# Patient Record
Sex: MALE | Race: OTHER | NOT HISPANIC OR LATINO
[De-identification: names, ages, dates, MRNs, and addresses within clinical notes are randomized per-mention and may not be internally consistent; named-entity substitution may affect disease eponyms.]

---

## 2017-06-20 PROBLEM — Z00.00 ENCOUNTER FOR PREVENTIVE HEALTH EXAMINATION: Status: ACTIVE | Noted: 2017-06-20

## 2017-06-22 ENCOUNTER — APPOINTMENT (OUTPATIENT)
Dept: OTOLARYNGOLOGY | Facility: CLINIC | Age: 47
End: 2017-06-22

## 2017-06-22 VITALS
TEMPERATURE: 98.6 F | SYSTOLIC BLOOD PRESSURE: 109 MMHG | HEART RATE: 62 BPM | HEIGHT: 71.5 IN | BODY MASS INDEX: 21.91 KG/M2 | WEIGHT: 160 LBS | DIASTOLIC BLOOD PRESSURE: 74 MMHG | OXYGEN SATURATION: 95 %

## 2017-06-22 DIAGNOSIS — J35.8 OTHER CHRONIC DISEASES OF TONSILS AND ADENOIDS: ICD-10-CM

## 2017-06-22 DIAGNOSIS — Z87.891 PERSONAL HISTORY OF NICOTINE DEPENDENCE: ICD-10-CM

## 2017-06-22 DIAGNOSIS — Z87.19 PERSONAL HISTORY OF OTHER DISEASES OF THE DIGESTIVE SYSTEM: ICD-10-CM

## 2017-06-22 DIAGNOSIS — J39.2 OTHER DISEASES OF PHARYNX: ICD-10-CM

## 2017-06-22 DIAGNOSIS — R19.6 HALITOSIS: ICD-10-CM

## 2017-06-22 RX ORDER — FINASTERIDE 5 MG/1
5 TABLET, FILM COATED ORAL
Qty: 30 | Refills: 0 | Status: ACTIVE | COMMUNITY
Start: 2016-11-08

## 2017-06-22 RX ORDER — CLOBETASOL PROPIONATE 0.5 MG/ML
0.05 SOLUTION TOPICAL
Qty: 50 | Refills: 0 | Status: ACTIVE | COMMUNITY
Start: 2016-11-08

## 2017-06-23 PROBLEM — Z87.891 FORMER SMOKER: Status: ACTIVE | Noted: 2017-06-23

## 2017-11-27 RX ORDER — DOCUSATE SODIUM 100 MG/1
100 CAPSULE ORAL
Qty: 1 | Refills: 0 | Status: ACTIVE | COMMUNITY
Start: 2017-11-27 | End: 1900-01-01

## 2017-11-27 RX ORDER — METHYLPREDNISOLONE 4 MG/1
4 TABLET ORAL
Qty: 1 | Refills: 0 | Status: ACTIVE | COMMUNITY
Start: 2017-11-27 | End: 1900-01-01

## 2017-11-27 RX ORDER — LIDOCAINE HYDROCHLORIDE 20 MG/ML
2 SOLUTION OROPHARYNGEAL
Qty: 1 | Refills: 3 | Status: ACTIVE | COMMUNITY
Start: 2017-11-27 | End: 1900-01-01

## 2017-11-28 ENCOUNTER — APPOINTMENT (OUTPATIENT)
Dept: OTOLARYNGOLOGY | Facility: AMBULATORY SURGERY CENTER | Age: 47
End: 2017-11-28

## 2017-11-28 ENCOUNTER — OUTPATIENT (OUTPATIENT)
Dept: OUTPATIENT SERVICES | Facility: HOSPITAL | Age: 47
LOS: 1 days | Discharge: HOME | End: 2017-11-28
Payer: COMMERCIAL

## 2017-11-28 PROCEDURE — 42826 REMOVAL OF TONSILS: CPT | Mod: 52

## 2017-11-28 PROCEDURE — 42808 EXCISE PHARYNX LESION: CPT

## 2017-12-05 ENCOUNTER — APPOINTMENT (OUTPATIENT)
Dept: OTOLARYNGOLOGY | Facility: CLINIC | Age: 47
End: 2017-12-05
Payer: COMMERCIAL

## 2017-12-05 VITALS
OXYGEN SATURATION: 96 % | SYSTOLIC BLOOD PRESSURE: 106 MMHG | TEMPERATURE: 98.5 F | HEART RATE: 61 BPM | DIASTOLIC BLOOD PRESSURE: 66 MMHG

## 2017-12-05 PROCEDURE — 99024 POSTOP FOLLOW-UP VISIT: CPT

## 2017-12-06 RX ORDER — ACETAMINOPHEN AND CODEINE 300; 30 MG/1; MG/1
300-30 TABLET ORAL
Qty: 30 | Refills: 0 | Status: COMPLETED | COMMUNITY
Start: 2017-11-27 | End: 2017-12-06

## 2017-12-06 RX ORDER — AMOXICILLIN 500 MG/1
500 CAPSULE ORAL
Qty: 14 | Refills: 0 | Status: COMPLETED | COMMUNITY
Start: 2017-11-27 | End: 2017-12-06

## 2020-10-18 ENCOUNTER — TRANSCRIPTION ENCOUNTER (OUTPATIENT)
Age: 50
End: 2020-10-18

## 2024-01-10 ENCOUNTER — OUTPATIENT (OUTPATIENT)
Dept: OUTPATIENT SERVICES | Facility: HOSPITAL | Age: 54
LOS: 1 days | End: 2024-01-10

## 2024-01-10 VITALS
HEART RATE: 55 BPM | WEIGHT: 149.91 LBS | OXYGEN SATURATION: 98 % | SYSTOLIC BLOOD PRESSURE: 133 MMHG | TEMPERATURE: 98 F | DIASTOLIC BLOOD PRESSURE: 72 MMHG | RESPIRATION RATE: 16 BRPM | HEIGHT: 71 IN

## 2024-01-10 DIAGNOSIS — M20.42 OTHER HAMMER TOE(S) (ACQUIRED), LEFT FOOT: ICD-10-CM

## 2024-01-10 DIAGNOSIS — Z41.8 ENCOUNTER FOR OTHER PROCEDURES FOR PURPOSES OTHER THAN REMEDYING HEALTH STATE: Chronic | ICD-10-CM

## 2024-01-10 DIAGNOSIS — Z98.890 OTHER SPECIFIED POSTPROCEDURAL STATES: Chronic | ICD-10-CM

## 2024-01-10 NOTE — H&P PST ADULT - FUNCTIONAL STATUS
Walks 1 to 2 blocks, climbs 1 flight of stairs, ADLs/4-10 METS patient is a marathon runner/4-10 METS

## 2024-01-10 NOTE — H&P PST ADULT - PROBLEM SELECTOR PLAN 1
Patient tentatively scheduled for patient is scheduled for left foot 2nd digit arthroplasty w/metatarsal phalangeal joint release and kwire fixation     Pre-op instructions provided. Pt given verbal and written instructions with teach back on chlorhexidine shampoo and Pepcid. Pt verbalized understanding with return demonstration.    copy CBC and BMP in the chart

## 2024-01-10 NOTE — H&P PST ADULT - NSICDXPASTSURGICALHX_GEN_ALL_CORE_FT
PAST SURGICAL HISTORY:  Encounter for elective hair transplant     S/P lateral meniscus repair of left knee

## 2024-01-10 NOTE — H&P PST ADULT - HISTORY OF PRESENT ILLNESS
Patient is 53 year male presents with preop dx of pain in left toe and acquired hammer toe. patient is scheduled for left foot 2nd digit arthroplasty w/metatarsal phalangeal joint release and kwire fixation

## 2024-01-16 ENCOUNTER — TRANSCRIPTION ENCOUNTER (OUTPATIENT)
Age: 54
End: 2024-01-16

## 2024-01-17 ENCOUNTER — RESULT REVIEW (OUTPATIENT)
Age: 54
End: 2024-01-17

## 2024-01-17 ENCOUNTER — OUTPATIENT (OUTPATIENT)
Dept: OUTPATIENT SERVICES | Facility: HOSPITAL | Age: 54
LOS: 1 days | Discharge: HOME | End: 2024-01-17
Payer: COMMERCIAL

## 2024-01-17 ENCOUNTER — TRANSCRIPTION ENCOUNTER (OUTPATIENT)
Age: 54
End: 2024-01-17

## 2024-01-17 VITALS
RESPIRATION RATE: 16 BRPM | HEART RATE: 64 BPM | WEIGHT: 149.91 LBS | TEMPERATURE: 98 F | OXYGEN SATURATION: 98 % | SYSTOLIC BLOOD PRESSURE: 119 MMHG | HEIGHT: 71 IN | DIASTOLIC BLOOD PRESSURE: 79 MMHG

## 2024-01-17 VITALS
RESPIRATION RATE: 17 BRPM | SYSTOLIC BLOOD PRESSURE: 136 MMHG | OXYGEN SATURATION: 100 % | HEART RATE: 51 BPM | DIASTOLIC BLOOD PRESSURE: 89 MMHG | TEMPERATURE: 97 F

## 2024-01-17 DIAGNOSIS — Z98.890 OTHER SPECIFIED POSTPROCEDURAL STATES: Chronic | ICD-10-CM

## 2024-01-17 DIAGNOSIS — M20.42 OTHER HAMMER TOE(S) (ACQUIRED), LEFT FOOT: ICD-10-CM

## 2024-01-17 DIAGNOSIS — Z41.8 ENCOUNTER FOR OTHER PROCEDURES FOR PURPOSES OTHER THAN REMEDYING HEALTH STATE: Chronic | ICD-10-CM

## 2024-01-17 PROCEDURE — 73630 X-RAY EXAM OF FOOT: CPT | Mod: 26,LT

## 2024-01-17 DEVICE — K-WIRE S&N DOUBLE TROCAR 0.045" X 4": Type: IMPLANTABLE DEVICE | Status: FUNCTIONAL

## 2024-01-17 RX ORDER — FAMOTIDINE 10 MG/ML
0 INJECTION INTRAVENOUS
Refills: 0 | DISCHARGE

## 2024-01-17 RX ORDER — MINOXIDIL 10 MG
1 TABLET ORAL
Refills: 0 | DISCHARGE

## 2024-01-17 RX ORDER — FINASTERIDE 5 MG/1
1 TABLET, FILM COATED ORAL
Refills: 0 | DISCHARGE

## 2024-01-17 NOTE — ASU PREOP CHECKLIST - BLOOD AVAILABLE
[facility] parkinsons disease htn with atrial fibrillation; allergy to asa;  ct of head, labs, xray  admit for cardiology eval; anticoagulation
n/a

## 2024-01-17 NOTE — ASU DISCHARGE PLAN (ADULT/PEDIATRIC) - CARE PROVIDER_API CALL
Abril Shelley  Podiatric Medicine and Surgery  [address] [place] [id number]  Phone: [phone]  Fax: [phone]  Follow Up Time: 1 week

## 2024-01-17 NOTE — ASU DISCHARGE PLAN (ADULT/PEDIATRIC) - NURSING INSTRUCTIONS
You were given IV Toradol for pain management in the Operating Room. Please do NOT take Ibuprofen (NSAIDS) products (Motrin, Aleve, Advil) for the next 6-8 hours (After  7:00PM). Please take ibuprophen with food to avoid gastrointestinal issues.

## 2024-08-24 ENCOUNTER — NON-APPOINTMENT (OUTPATIENT)
Age: 54
End: 2024-08-24

## 2025-02-05 ENCOUNTER — DOCTOR'S OFFICE (OUTPATIENT)
Facility: LOCATION | Age: 55
Setting detail: OPHTHALMOLOGY
End: 2025-02-05
Payer: COMMERCIAL

## 2025-02-05 DIAGNOSIS — H25.013: ICD-10-CM

## 2025-02-05 DIAGNOSIS — H52.4: ICD-10-CM

## 2025-02-05 DIAGNOSIS — H01.001: ICD-10-CM

## 2025-02-05 DIAGNOSIS — H11.152: ICD-10-CM

## 2025-02-05 DIAGNOSIS — H52.03: ICD-10-CM

## 2025-02-05 DIAGNOSIS — H01.004: ICD-10-CM

## 2025-02-05 PROCEDURE — 92015 DETERMINE REFRACTIVE STATE: CPT | Performed by: OPHTHALMOLOGY

## 2025-02-05 PROCEDURE — 92014 COMPRE OPH EXAM EST PT 1/>: CPT | Performed by: OPHTHALMOLOGY

## 2025-02-05 ASSESSMENT — REFRACTION_AUTOREFRACTION
OD_CYLINDER: -0.50
OS_CYLINDER: SPHERE
OS_AXIS: 000
OD_SPHERE: +1.50
OD_AXIS: 087
OS_SPHERE: +0.50

## 2025-02-05 ASSESSMENT — REFRACTION_MANIFEST
OS_CYLINDER: SPHERE
OD_VA1: 20/20
OD_AXIS: 090
OD_VA1: 20/20
OD_VA1: 20/20
OD_SPHERE: +1.50
OD_SPHERE: +1.50
OD_CYLINDER: -0.50
OS_VA1: 20/20
OS_VA1: 20/20
OS_SPHERE: +0.75
OS_AXIS: 000
OD_CYLINDER: -0.50
OS_VA1: 20/20
OD_SPHERE: +0.75
OS_CYLINDER: SPHERE
OS_ADD: +2.25
OD_ADD: +2.25
OS_SPHERE: +0.75
OS_CYLINDER: SPHERE
OS_SPHERE: +0.50
OD_CYLINDER: -0.50
OD_AXIS: 075
OD_AXIS: 090
OS_ADD: +2.25
OD_ADD: +2.25

## 2025-02-05 ASSESSMENT — REFRACTION_CURRENTRX
OD_SPHERE: +0.25
OD_CYLINDER: SPHERE
OD_OVR_VA: 20/
OS_AXIS: 131
OS_CYLINDER: -0.25
OD_VPRISM_DIRECTION: PROGS
OS_SPHERE: +0.50
OD_ADD: +2.00
OS_ADD: +2.25
OS_AXIS: 000
OD_AXIS: 071
OS_OVR_VA: 20/
OD_AXIS: 0
OS_OVR_VA: 20/
OS_VPRISM_DIRECTION: PROGS
OD_OVR_VA: 20/
OS_CYLINDER: SPHERE
OS_ADD: +2.00
OS_SPHERE: +0.50
OD_ADD: +2.00
OD_SPHERE: +1.00
OD_CYLINDER: -0.50

## 2025-02-05 ASSESSMENT — KERATOMETRY
OD_K2POWER_DIOPTERS: 40.50
OD_AXISANGLE_DEGREES: 090
OD_K1POWER_DIOPTERS: 40.50
OS_K1POWER_DIOPTERS: 40.00
OS_K2POWER_DIOPTERS: 40.25
OS_AXISANGLE_DEGREES: 166

## 2025-02-05 ASSESSMENT — CONFRONTATIONAL VISUAL FIELD TEST (CVF)
OS_FINDINGS: FULL
OD_FINDINGS: FULL

## 2025-02-05 ASSESSMENT — LID EXAM ASSESSMENTS
OD_BLEPHARITIS: RUL
OS_BLEPHARITIS: LUL

## 2025-02-05 ASSESSMENT — TONOMETRY
OD_IOP_MMHG: 16
OS_IOP_MMHG: 16

## 2025-02-05 ASSESSMENT — VISUAL ACUITY
OD_BCVA: 20/20
OS_BCVA: 20/20-2

## 2025-08-13 NOTE — ASU PREOPERATIVE ASSESSMENT, ADULT ([facility] ONLY) - FALL HARM RISK - CONCLUSION
Speech Therapy    Patient not seen in therapy.    Unavailable due to sleeping soundly.      Additional details:  Received patient in bed, sleeping. Unable to maintain adequate alertness despite verbal/tactile stimuli. Per RN, overall poor oral intake.     OBJECTIVE                                 Therapy procedure time and total treatment time can be found documented on the Time Entry flowsheet   Universal Safety Interventions

## (undated) DEVICE — TOURNIQUET ESMARK 4"

## (undated) DEVICE — TOURNIQUET CUFF 18" DUAL PORT SINGLE BLADDER LUER LOCK (BLACK)

## (undated) DEVICE — WARMING BLANKET UPPER ADULT

## (undated) DEVICE — SYR LUER LOK 10CC

## (undated) DEVICE — SAW BLADE MICROAIRE SAGITTAL 9.4MMX25.4MMX0.6MM

## (undated) DEVICE — BLADE SURGICAL #15 CARBON

## (undated) DEVICE — SUT VICRYL 2-0 27" PS-2 UNDYED

## (undated) DEVICE — PREP BETADINE KIT

## (undated) DEVICE — POSITIONER FOAM EGG CRATE ULNAR 2PCS (PINK)

## (undated) DEVICE — PACK LIJ BASIC ORTHO

## (undated) DEVICE — SOL IRR POUR NS 0.9% 500ML

## (undated) DEVICE — ELCTR GROUNDING PAD ADULT COVIDIEN

## (undated) DEVICE — DRSG STOCKINETTE TUBULAR 6"

## (undated) DEVICE — FRAZIER SUCTION TIP 8FR

## (undated) DEVICE — DRSG KLING 4"

## (undated) DEVICE — VENODYNE/SCD SLEEVE CALF LARGE

## (undated) DEVICE — DRSG ACE BANDAGE 4" NS

## (undated) DEVICE — NDL HYPO REGULAR BEVEL 25G X 1.5" (BLUE)

## (undated) DEVICE — LABELS BLANK W PEN

## (undated) DEVICE — VENODYNE/SCD SLEEVE CALF MEDIUM

## (undated) DEVICE — ELCTR BOVIE TIP BLADE INSULATED 4" EDGE

## (undated) DEVICE — GLV 8 PROTEXIS (WHITE)

## (undated) DEVICE — DRSG STERISTRIPS 0.5 X 4"

## (undated) DEVICE — NDL HYPO SAFE 18G X 1.5" (PINK)

## (undated) DEVICE — PREP CHLORAPREP HI-LITE ORANGE 26ML

## (undated) DEVICE — SUT VICRYL 4-0 18" PS-2 UNDYED

## (undated) DEVICE — POSITIONER PATIENT SAFETY STRAP 3X60"

## (undated) DEVICE — VENODYNE/SCD SLEEVE CALF BARIATRIC

## (undated) DEVICE — ELCTR ROCKER SWITCH PENCIL BLUE 10FT

## (undated) DEVICE — SUT ETHILON 4-0 18" PS-2

## (undated) DEVICE — SAW BLADE CONMED LINVATEC MICRO SAGITTAL COARSE 5.5 X 25.5 X 0.4MM